# Patient Record
Sex: MALE | Race: WHITE
[De-identification: names, ages, dates, MRNs, and addresses within clinical notes are randomized per-mention and may not be internally consistent; named-entity substitution may affect disease eponyms.]

---

## 2018-06-05 ENCOUNTER — HOSPITAL ENCOUNTER (EMERGENCY)
Dept: HOSPITAL 47 - EC | Age: 23
LOS: 1 days | Discharge: HOME | End: 2018-06-06
Payer: COMMERCIAL

## 2018-06-05 VITALS — RESPIRATION RATE: 18 BRPM

## 2018-06-05 DIAGNOSIS — K92.2: Primary | ICD-10-CM

## 2018-06-05 LAB
ALBUMIN SERPL-MCNC: 4.4 G/DL (ref 3.5–5)
ALP SERPL-CCNC: 45 U/L (ref 38–126)
ALT SERPL-CCNC: 37 U/L (ref 21–72)
ANION GAP SERPL CALC-SCNC: 13 MMOL/L
AST SERPL-CCNC: 24 U/L (ref 17–59)
BASOPHILS # BLD AUTO: 0 K/UL (ref 0–0.2)
BASOPHILS NFR BLD AUTO: 0 %
BUN SERPL-SCNC: 13 MG/DL (ref 9–20)
CALCIUM SPEC-MCNC: 9.9 MG/DL (ref 8.4–10.2)
CHLORIDE SERPL-SCNC: 104 MMOL/L (ref 98–107)
CK SERPL-CCNC: 96 U/L (ref 55–170)
CO2 SERPL-SCNC: 24 MMOL/L (ref 22–30)
EOSINOPHIL # BLD AUTO: 0.2 K/UL (ref 0–0.7)
EOSINOPHIL NFR BLD AUTO: 3 %
ERYTHROCYTE [DISTWIDTH] IN BLOOD BY AUTOMATED COUNT: 4.77 M/UL (ref 4.3–5.9)
ERYTHROCYTE [DISTWIDTH] IN BLOOD: 12.6 % (ref 11.5–15.5)
GLUCOSE SERPL-MCNC: 103 MG/DL (ref 74–99)
HCT VFR BLD AUTO: 41.3 % (ref 39–53)
HGB BLD-MCNC: 14.5 GM/DL (ref 13–17.5)
LYMPHOCYTES # SPEC AUTO: 2.6 K/UL (ref 1–4.8)
LYMPHOCYTES NFR SPEC AUTO: 36 %
MCH RBC QN AUTO: 30.3 PG (ref 25–35)
MCHC RBC AUTO-ENTMCNC: 35 G/DL (ref 31–37)
MCV RBC AUTO: 86.6 FL (ref 80–100)
MONOCYTES # BLD AUTO: 0.5 K/UL (ref 0–1)
MONOCYTES NFR BLD AUTO: 7 %
NEUTROPHILS # BLD AUTO: 3.8 K/UL (ref 1.3–7.7)
NEUTROPHILS NFR BLD AUTO: 52 %
PLATELET # BLD AUTO: 215 K/UL (ref 150–450)
POTASSIUM SERPL-SCNC: 3.7 MMOL/L (ref 3.5–5.1)
PROT SERPL-MCNC: 6.6 G/DL (ref 6.3–8.2)
SODIUM SERPL-SCNC: 141 MMOL/L (ref 137–145)
WBC # BLD AUTO: 7.3 K/UL (ref 3.8–10.6)

## 2018-06-05 PROCEDURE — 87324 CLOSTRIDIUM AG IA: CPT

## 2018-06-05 PROCEDURE — 85025 COMPLETE CBC W/AUTO DIFF WBC: CPT

## 2018-06-05 PROCEDURE — 87046 STOOL CULTR AEROBIC BACT EA: CPT

## 2018-06-05 PROCEDURE — 82550 ASSAY OF CK (CPK): CPT

## 2018-06-05 PROCEDURE — 87040 BLOOD CULTURE FOR BACTERIA: CPT

## 2018-06-05 PROCEDURE — 82553 CREATINE MB FRACTION: CPT

## 2018-06-05 PROCEDURE — 99284 EMERGENCY DEPT VISIT MOD MDM: CPT

## 2018-06-05 PROCEDURE — 96375 TX/PRO/DX INJ NEW DRUG ADDON: CPT

## 2018-06-05 PROCEDURE — 87329 GIARDIA AG IA: CPT

## 2018-06-05 PROCEDURE — 87328 CRYPTOSPORIDIUM AG IA: CPT

## 2018-06-05 PROCEDURE — 83605 ASSAY OF LACTIC ACID: CPT

## 2018-06-05 PROCEDURE — 96361 HYDRATE IV INFUSION ADD-ON: CPT

## 2018-06-05 PROCEDURE — 96374 THER/PROPH/DIAG INJ IV PUSH: CPT

## 2018-06-05 PROCEDURE — 36415 COLL VENOUS BLD VENIPUNCTURE: CPT

## 2018-06-05 PROCEDURE — 87045 FECES CULTURE AEROBIC BACT: CPT

## 2018-06-05 PROCEDURE — 80053 COMPREHEN METABOLIC PANEL: CPT

## 2018-06-06 VITALS — HEART RATE: 47 BPM | DIASTOLIC BLOOD PRESSURE: 72 MMHG | SYSTOLIC BLOOD PRESSURE: 123 MMHG | TEMPERATURE: 98.5 F

## 2018-06-06 NOTE — ED
GI Bleed HPI





- General


Chief complaint: GI Bleed


Stated complaint: Blood In Stool


Time Seen by Provider: 06/05/18 22:37


Source: patient


Mode of arrival: ambulatory


Limitations: no limitations





- History of Present Illness


Initial comments: 


22 years old male been vomiting since 11 PM since yesterday also complaining 

about some abdominal discomfort in the lower abdomen it only happens when he 

needs to move his bowels right now he has no abdominal pain.  Denies any 

history of stomach ulcers he hasn't traveled outside the country and he also 

been moving his bowels quite frequently and he noticed some blood in there and 

he had Pictures on his cell phone when I seen there was a clear blood in the 

stool in the portable he denies any history of ulcerative colitis or Crohn's 

disease in himself or his family he moved his bowels but 10 times today.  He is 

also noticed some mucus in his stool no fever no chills no abdominal pain right 

now








- Related Data


 Home Medications











 Medication  Instructions  Recorded  Confirmed


 


Fexofenadine HCl [Allegra Allergy] 90 mg PO DAILY PRN 06/05/18 06/05/18








 Previous Rx's











 Medication  Instructions  Recorded


 


Ciprofloxacin HCl [Cipro] 500 mg PO Q12HR #14 tablet 06/06/18


 


metroNIDAZOLE [Flagyl] 500 mg PO Q8HR #21 tab 06/06/18











 Allergies











Allergy/AdvReac Type Severity Reaction Status Date / Time


 


No Known Allergies Allergy   Verified 06/05/18 22:21














Review of Systems


ROS Statement: 


Those systems with pertinent positive or pertinent negative responses have been 

documented in the HPI.





ROS Other: All systems not noted in ROS Statement are negative.





Past Medical History


Past Medical History: No Reported History


History of Any Multi-Drug Resistant Organisms: None Reported


Past Surgical History: No Surgical Hx Reported


Smoking Status: Never smoker


Past Alcohol Use History: Occasional


Past Drug Use History: None Reported





General Exam





- General Exam Comments


Initial Comments: 


General:  The patient is awake and alert, in no distress, and does not appear 

acutely ill.  He looks dehydrated and tired


Skin:  Skin is warm and dry and no rashes or lesions are noted. 


Eye:  Pupils are equal, round and reactive to light, extra-ocular movements are 

intact; there is normal conjunctiva bilaterally.  


Ears, nose, mouth and throat:  There are moist mucous membranes and no oral 

lesions. 


Neck:  The neck is supple, there is no tenderness  or JVD.  


Cardiovascular:  There is a regular rate and rhythm. No murmur, rub or gallop 

is appreciated.


Respiratory: To auscultation bilateral, no wheezing no rhonchi no distress  

respiratory wise noticed


Gastrointestinal:  Soft, non-distended, non-tender abdomen without masses or 

organomegaly noted. There is no rebound or guarding present. Bowel sounds are 

unremarkable.  No areas of focal tenderness noticed at all


Back:  There is no tenderness to palpation in the midline. There is no obvious 

deformity.


Musculoskeletal:  Normal ROM, no tenderness, There is no pedal edema. There is 

no calf tenderness or swelling. No cords were appreciated.  


Neurological:  CN II-XII intact, Cranial nerves III through XII are intact. 

There are no obvious motor or sensory deficits. Coordination appears grossly 

intact. Speech is normal.


Psychiatric:  Cooperative, appropriate mood & affect, normal judgment.  








Limitations: no limitations





Course





 Vital Signs











  06/05/18 06/06/18 06/06/18





  22:09 00:00 01:23


 


Temperature 98.4 F  98.5 F


 


Pulse Rate 54 L 50 L 47 L


 


Respiratory 18 18 18





Rate   


 


Blood Pressure 111/68 121/78 123/72


 


O2 Sat by Pulse 100 99 100





Oximetry   








Upon reassessment noticed CBC is normal hemoglobin is 14.5, BS metabolic panel 

is within normal state, he did move his bowels in the ER and did notice some 

some greenish color stools with some blood tinge there hemodynamically stable.  

He was offered to be observed overnight but he has appointment with the Dr. Salvador already 2 PM tomorrow afternoon, he be discharged home with the Cipro 

Flagyl to cover any possible bacterial enteritis or new 4+ invasion of the GI 

tract he is advised come back if his symptoms get worse





Medical Decision Making





- Lab Data


Result diagrams: 


 06/05/18 23:09





 06/05/18 23:09





 Lab Results











  06/05/18 06/05/18 06/05/18 Range/Units





  23:09 23:09 23:09 


 


WBC   7.3   (3.8-10.6)  k/uL


 


RBC   4.77   (4.30-5.90)  m/uL


 


Hgb   14.5   (13.0-17.5)  gm/dL


 


Hct   41.3   (39.0-53.0)  %


 


MCV   86.6   (80.0-100.0)  fL


 


MCH   30.3   (25.0-35.0)  pg


 


MCHC   35.0   (31.0-37.0)  g/dL


 


RDW   12.6   (11.5-15.5)  %


 


Plt Count   215   (150-450)  k/uL


 


Neutrophils %   52   %


 


Lymphocytes %   36   %


 


Monocytes %   7   %


 


Eosinophils %   3   %


 


Basophils %   0   %


 


Neutrophils #   3.8   (1.3-7.7)  k/uL


 


Lymphocytes #   2.6   (1.0-4.8)  k/uL


 


Monocytes #   0.5   (0-1.0)  k/uL


 


Eosinophils #   0.2   (0-0.7)  k/uL


 


Basophils #   0.0   (0-0.2)  k/uL


 


Sodium    141  (137-145)  mmol/L


 


Potassium    3.7  (3.5-5.1)  mmol/L


 


Chloride    104  ()  mmol/L


 


Carbon Dioxide    24  (22-30)  mmol/L


 


Anion Gap    13  mmol/L


 


BUN    13  (9-20)  mg/dL


 


Creatinine    0.90  (0.66-1.25)  mg/dL


 


Est GFR (CKD-EPI)AfAm    >90  (>60 ml/min/1.73 sqM)  


 


Est GFR (CKD-EPI)NonAf    >90  (>60 ml/min/1.73 sqM)  


 


Glucose    103 H  (74-99)  mg/dL


 


Plasma Lactic Acid Kee     (0.7-2.0)  mmol/L


 


Calcium    9.9  (8.4-10.2)  mg/dL


 


Total Bilirubin    1.1  (0.2-1.3)  mg/dL


 


AST    24  (17-59)  U/L


 


ALT    37  (21-72)  U/L


 


Alkaline Phosphatase    45  ()  U/L


 


Total Creatine Kinase  96    ()  U/L


 


CK-MB (CK-2)  0.6    (0.0-2.4)  ng/mL


 


CK-MB (CK-2) Rel Index  0.6    


 


Total Protein    6.6  (6.3-8.2)  g/dL


 


Albumin    4.4  (3.5-5.0)  g/dL


 


C. difficile (EIA) Intrp     (Negative)  














  06/05/18 06/06/18 Range/Units





  23:09 00:01 


 


WBC    (3.8-10.6)  k/uL


 


RBC    (4.30-5.90)  m/uL


 


Hgb    (13.0-17.5)  gm/dL


 


Hct    (39.0-53.0)  %


 


MCV    (80.0-100.0)  fL


 


MCH    (25.0-35.0)  pg


 


MCHC    (31.0-37.0)  g/dL


 


RDW    (11.5-15.5)  %


 


Plt Count    (150-450)  k/uL


 


Neutrophils %    %


 


Lymphocytes %    %


 


Monocytes %    %


 


Eosinophils %    %


 


Basophils %    %


 


Neutrophils #    (1.3-7.7)  k/uL


 


Lymphocytes #    (1.0-4.8)  k/uL


 


Monocytes #    (0-1.0)  k/uL


 


Eosinophils #    (0-0.7)  k/uL


 


Basophils #    (0-0.2)  k/uL


 


Sodium    (137-145)  mmol/L


 


Potassium    (3.5-5.1)  mmol/L


 


Chloride    ()  mmol/L


 


Carbon Dioxide    (22-30)  mmol/L


 


Anion Gap    mmol/L


 


BUN    (9-20)  mg/dL


 


Creatinine    (0.66-1.25)  mg/dL


 


Est GFR (CKD-EPI)AfAm    (>60 ml/min/1.73 sqM)  


 


Est GFR (CKD-EPI)NonAf    (>60 ml/min/1.73 sqM)  


 


Glucose    (74-99)  mg/dL


 


Plasma Lactic Acid Kee  0.8   (0.7-2.0)  mmol/L


 


Calcium    (8.4-10.2)  mg/dL


 


Total Bilirubin    (0.2-1.3)  mg/dL


 


AST    (17-59)  U/L


 


ALT    (21-72)  U/L


 


Alkaline Phosphatase    ()  U/L


 


Total Creatine Kinase    ()  U/L


 


CK-MB (CK-2)    (0.0-2.4)  ng/mL


 


CK-MB (CK-2) Rel Index    


 


Total Protein    (6.3-8.2)  g/dL


 


Albumin    (3.5-5.0)  g/dL


 


C. difficile (EIA) Intrp   Negative  (Negative)  














Disposition


Clinical Impression: 


 Diarrhea, GI bleed





Disposition: HOME SELF-CARE


Condition: Good


Instructions:  Gastrointestinal Bleeding (ED)


Prescriptions: 


Ciprofloxacin HCl [Cipro] 500 mg PO Q12HR #14 tablet


metroNIDAZOLE [Flagyl] 500 mg PO Q8HR #21 tab


Is patient prescribed a controlled substance at d/c from ED?: No


Referrals: 


Fran Guo MD [Primary Care Provider] - 1-2 days


Love Velásquez MD [STAFF PHYSICIAN] - 1-2 days

## 2022-06-03 ENCOUNTER — HOSPITAL ENCOUNTER (OUTPATIENT)
Dept: HOSPITAL 47 - ORWHC2ENDO | Age: 27
Discharge: HOME | End: 2022-06-03
Attending: SURGERY
Payer: COMMERCIAL

## 2022-06-03 VITALS — SYSTOLIC BLOOD PRESSURE: 111 MMHG | DIASTOLIC BLOOD PRESSURE: 64 MMHG | HEART RATE: 53 BPM

## 2022-06-03 VITALS — RESPIRATION RATE: 16 BRPM

## 2022-06-03 VITALS — TEMPERATURE: 97.6 F

## 2022-06-03 VITALS — BODY MASS INDEX: 18.7 KG/M2

## 2022-06-03 DIAGNOSIS — Z83.3: ICD-10-CM

## 2022-06-03 DIAGNOSIS — F32.A: ICD-10-CM

## 2022-06-03 DIAGNOSIS — Z79.899: ICD-10-CM

## 2022-06-03 DIAGNOSIS — R19.4: ICD-10-CM

## 2022-06-03 DIAGNOSIS — Z82.3: ICD-10-CM

## 2022-06-03 DIAGNOSIS — Z82.49: ICD-10-CM

## 2022-06-03 DIAGNOSIS — F41.9: ICD-10-CM

## 2022-06-03 DIAGNOSIS — Z98.890: ICD-10-CM

## 2022-06-03 DIAGNOSIS — F90.9: ICD-10-CM

## 2022-06-03 DIAGNOSIS — Z82.0: ICD-10-CM

## 2022-06-03 DIAGNOSIS — K64.4: Primary | ICD-10-CM

## 2022-06-03 DIAGNOSIS — K92.2: ICD-10-CM

## 2022-06-03 DIAGNOSIS — Z83.42: ICD-10-CM

## 2022-06-03 DIAGNOSIS — E78.00: ICD-10-CM

## 2022-06-03 PROCEDURE — 88305 TISSUE EXAM BY PATHOLOGIST: CPT

## 2022-06-03 PROCEDURE — 45378 DIAGNOSTIC COLONOSCOPY: CPT

## 2022-06-03 NOTE — P.OP
Date of Procedure: 06/03/22


Preoperative Diagnosis: 


GI bleed


Postoperative Diagnosis: 


External hemorrhoids


Procedure(s) Performed: 


Colonoscopy


Anesthesia: MAC


Surgeon: Jones Bird


Condition: stable


Disposition: same day


Description of Procedure: 


Patient was brought Endo suite placed in the left lateral decubitus position 

underwent sedation per department of anesthesia timeout performed correct 

patient correct procedure correct site was verified rectal exam was performed no

gross abnormalities were noted scope was passed from the rectum to the cecum 

with ease and it was placed in the the terminal ileum and abnormalities were 

noted biopsies taken to rule out inflammatory bowel disease the scope was slowly

withdrawn to visualize all walls of the colon on the way out no gross 

abnormalities are noted.  Scope was retroflexed in the rectum and no gross 

abnormalities are noted scope was withdrawn and there were some external 

hemorrhoids noted small.

## 2022-06-07 NOTE — P.GSHP
History of Present Illness


H&P Date: 06/03/22





presents for colonoscopy. see paper chart for detailed H and P





Past Medical History


Past Medical History: No Reported History


Additional Past Medical History / Comment(s): BLOOD IN STOOL OFF AND ON FOR PAST

FEW YEARS


History of Any Multi-Drug Resistant Organisms: None Reported


Past Surgical History: Tonsillectomy


Additional Past Surgical History / Comment(s): CYSTS REMOVED FROM FACE AND NECK.

 BMT AS CHILD


Past Anesthesia/Blood Transfusion Reactions: No Reported Reaction


Smoking Status: Never smoker





- Past Family History


  ** Mother


Family Medical History: No Reported History





Medications and Allergies


                                Home Medications











 Medication  Instructions  Recorded  Confirmed  Type


 


Loratadine [Claritin] 10 mg PO DAILY 06/01/22 06/03/22 History


 


Psyllium Husk [Metamucil] 0.4 gm PO DAILY 06/01/22 06/03/22 History








                                    Allergies











Allergy/AdvReac Type Severity Reaction Status Date / Time


 


No Known Allergies Allergy   Verified 06/03/22 12:06














Surgical - Exam


Osteopathic Statement: *.  No significant issues noted on an osteopathic 

structural exam other than those noted in the History and Physical/Consult.


                                   Vital Signs











Temp Pulse Resp BP Pulse Ox


 


 97.6 F   61   16   127/66   97 


 


 06/03/22 12:01  06/03/22 12:01  06/03/22 12:01  06/03/22 12:01  06/03/22 12:01














- General


well developed, well nourished, no distress





- Eyes


PERRL





- Neck


trachea midline





- Respiratory


normal expansion, normal respiratory effort





- Abdomen


Abdomen: soft, non tender





Assessment and Plan


Assessment: 





Colonoscopy


Plan: 





colonoscopy

## 2024-10-22 ENCOUNTER — HOSPITAL ENCOUNTER (OUTPATIENT)
Dept: HOSPITAL 47 - RADCTMAIN | Age: 29
Discharge: HOME | End: 2024-10-22
Attending: OTOLARYNGOLOGY
Payer: COMMERCIAL

## 2024-10-22 DIAGNOSIS — R22.1: Primary | ICD-10-CM

## 2024-10-22 PROCEDURE — 70491 CT SOFT TISSUE NECK W/DYE: CPT

## 2024-10-22 NOTE — CT
EXAMINATION TYPE: CT soft tissue neck w con

CT DLP: 288.9 mGycm, Automated exposure control for dose reduction was used.

 

DATE OF EXAM: 10/22/2024 12:30 PM

 

COMPARISON: None.  

 

CLINICAL INDICATION:Male, 29 years old with history of R22.1 NECK MASS; PHH, Small lump on neck on le
ft side underneath jaw xyears.

 

TECHNIQUE: Standard enhanced CT of the neck following intravenous administration of 100 cc of Isovue 
300.  Axial sections with coronal and sagittal reformats were obtained. 

 

FINDINGS: 

Brain: Visualized portions are grossly unremarkable.

Orbits: Unremarkable 

Sinuses: Grossly unremarkable.

Suprahyoid Neck: The oropharynx, oral cavity, parapharyngeal and retropharyngeal spaces are clear and
 symmetric. The nasopharynx is unremarkable.

Infrahyoid Neck: The larynx, hypopharynx, and supraglottic area are clear and symmetric.  

 

Parotid Glands: Unremarkable.

Submandibular Glands: Unremarkable.

 

Musculoskeletal: No acute osseous pathology.

Lymph nodes:  No lymphadenopathy identified.  

Vascular structures: Visualized major arteries are patent without evidence of aneurysm.

Thoracic Inlet/airway: Airway is patent. The lung apices are clear.

Soft tissues/Thyroid: Thyroid and remainder of the soft tissues are unremarkable.

Other: none.

 

IMPRESSION

No CT evidence for abnormality corresponding to patient's symptomology. Consider ultrasound if there 
is continued clinical concern.

 

[facility], Workstation: [id number], 10/22/2024 2:28 PM